# Patient Record
Sex: FEMALE | Race: WHITE | NOT HISPANIC OR LATINO | Employment: UNEMPLOYED | URBAN - METROPOLITAN AREA
[De-identification: names, ages, dates, MRNs, and addresses within clinical notes are randomized per-mention and may not be internally consistent; named-entity substitution may affect disease eponyms.]

---

## 2017-01-02 ENCOUNTER — TELEPHONE (OUTPATIENT)
Dept: OBSTETRICS AND GYNECOLOGY | Facility: CLINIC | Age: 52
End: 2017-01-02

## 2017-01-02 DIAGNOSIS — M06.9 RHEUMATOID ARTHRITIS, INVOLVING UNSPECIFIED SITE, UNSPECIFIED RHEUMATOID FACTOR PRESENCE: ICD-10-CM

## 2017-01-02 DIAGNOSIS — Z11.3 SCREEN FOR STD (SEXUALLY TRANSMITTED DISEASE): Primary | ICD-10-CM

## 2017-01-03 ENCOUNTER — LAB VISIT (OUTPATIENT)
Dept: LAB | Facility: HOSPITAL | Age: 52
End: 2017-01-03
Attending: OBSTETRICS & GYNECOLOGY
Payer: MEDICAID

## 2017-01-03 DIAGNOSIS — Z11.3 SCREEN FOR STD (SEXUALLY TRANSMITTED DISEASE): ICD-10-CM

## 2017-01-03 DIAGNOSIS — M06.9 RHEUMATOID ARTHRITIS, INVOLVING UNSPECIFIED SITE, UNSPECIFIED RHEUMATOID FACTOR PRESENCE: ICD-10-CM

## 2017-01-03 LAB — RHEUMATOID FACT SERPL-ACNC: <10 IU/ML

## 2017-01-03 PROCEDURE — 80074 ACUTE HEPATITIS PANEL: CPT

## 2017-01-03 PROCEDURE — 36415 COLL VENOUS BLD VENIPUNCTURE: CPT | Mod: PO

## 2017-01-03 PROCEDURE — 86431 RHEUMATOID FACTOR QUANT: CPT

## 2017-01-04 LAB
HAV IGM SERPL QL IA: NEGATIVE
HBV CORE IGM SERPL QL IA: NEGATIVE
HBV SURFACE AG SERPL QL IA: NEGATIVE
HCV AB SERPL QL IA: NEGATIVE

## 2017-01-05 ENCOUNTER — TELEPHONE (OUTPATIENT)
Dept: OBSTETRICS AND GYNECOLOGY | Facility: CLINIC | Age: 52
End: 2017-01-05

## 2017-01-05 NOTE — TELEPHONE ENCOUNTER
Her hepatitis panel was negatvie, but her chol was 250! Needs to diet/excercis and rpt in 2-3 months, call us when ready. If stays high will need meds with her primary. thanks

## 2017-01-06 NOTE — TELEPHONE ENCOUNTER
Informed patient of results. Patient voiced understanding. Notified patient to call office if there were any further questions.   Patient inquired about what precautionary methods her and her partner can do to prevent transmission. Patient given precautions and education and to follow up with hepatologist. Verbalized understanding.

## 2017-01-17 ENCOUNTER — TELEPHONE (OUTPATIENT)
Dept: SURGERY | Facility: CLINIC | Age: 52
End: 2017-01-17

## 2017-01-17 NOTE — TELEPHONE ENCOUNTER
----- Message from Temo Lyons sent at 1/17/2017 10:44 AM CST -----  Patient states that she needs to speak with nurse in ref to scheduling an appt for tumor in neck;pt states that she has an appt at 1, if called after, leave message with her mom Amber//please call back at [phone] or [phone]//thank you

## 2017-01-27 ENCOUNTER — TELEPHONE (OUTPATIENT)
Dept: SURGERY | Facility: CLINIC | Age: 52
End: 2017-01-27

## 2017-01-27 NOTE — TELEPHONE ENCOUNTER
----- Message from Rafael Randolph sent at 1/27/2017 11:22 AM CST -----  Contact: Amber Weiss- Mother  Dena,    Caller wants to speak with you to set up appt with her daughter. Caller said she faxed records over. Please call regarding this at [phone] or [phone]

## 2017-01-27 NOTE — TELEPHONE ENCOUNTER
Spoke with pt mother, I faxed over the release to [facility] but I have not received the records yet, I will call back once I have received the records

## 2017-01-30 ENCOUNTER — TELEPHONE (OUTPATIENT)
Dept: SURGERY | Facility: CLINIC | Age: 52
End: 2017-01-30

## 2017-01-30 NOTE — TELEPHONE ENCOUNTER
RECEIVED SOME RECORDS FROM [facility] , ALTHOUGH THEY ARE NOT THE RECORDS WE NEED, I WILL SCHEDULE AN APPOINTMENT ANYWAYS

## 2017-02-07 ENCOUNTER — TELEPHONE (OUTPATIENT)
Dept: OBSTETRICS AND GYNECOLOGY | Facility: CLINIC | Age: 52
End: 2017-02-07

## 2017-02-07 NOTE — TELEPHONE ENCOUNTER
Pt would like to get something for hot flashes. She has medicaid so needs something that will be covered.

## 2017-02-07 NOTE — TELEPHONE ENCOUNTER
----- Message from Karin Blanchard sent at 2/7/2017  3:19 PM CST -----  Contact: self/[phone]  Patient would like a prescription for something for hot flashes because she is sweating a lot at night and she would like this to be a medication that is covered by medicaid.  Please advise

## 2017-02-08 ENCOUNTER — LAB VISIT (OUTPATIENT)
Dept: LAB | Facility: HOSPITAL | Age: 52
End: 2017-02-08
Attending: INTERNAL MEDICINE
Payer: MEDICAID

## 2017-02-08 DIAGNOSIS — R19.09 ABDOMINAL OR PELVIC SWELLING, MASS, OR LUMP, OTHER SPECIFIED SITE: ICD-10-CM

## 2017-02-08 DIAGNOSIS — N63.0 BREAST LUMP: Primary | ICD-10-CM

## 2017-02-08 DIAGNOSIS — R11.2 NAUSEA WITH VOMITING: ICD-10-CM

## 2017-02-08 DIAGNOSIS — R93.5 NONSPECIFIC (ABNORMAL) FINDINGS ON RADIOLOGICAL AND OTHER EXAMINATION OF ABDOMINAL AREA, INCLUDING RETROPERITONEUM: Primary | ICD-10-CM

## 2017-02-08 LAB
25(OH)D3+25(OH)D2 SERPL-MCNC: 20 NG/ML
ALBUMIN SERPL BCP-MCNC: 4.5 G/DL
ALP SERPL-CCNC: 80 U/L
ALT SERPL W/O P-5'-P-CCNC: 23 U/L
AMYLASE SERPL-CCNC: 49 U/L
ANION GAP SERPL CALC-SCNC: 9 MMOL/L
AST SERPL-CCNC: 23 U/L
BASOPHILS # BLD AUTO: 0.05 K/UL
BASOPHILS NFR BLD: 0.6 %
BILIRUB SERPL-MCNC: 0.2 MG/DL
BUN SERPL-MCNC: 17 MG/DL
CALCIUM SERPL-MCNC: 9.7 MG/DL
CALCIUM SERPL-MCNC: 9.7 MG/DL
CHLORIDE SERPL-SCNC: 98 MMOL/L
CO2 SERPL-SCNC: 30 MMOL/L
CREAT SERPL-MCNC: 0.8 MG/DL
CRP SERPL-MCNC: 2.6 MG/L
DIFFERENTIAL METHOD: ABNORMAL
EOSINOPHIL # BLD AUTO: 0.2 K/UL
EOSINOPHIL NFR BLD: 2.8 %
ERYTHROCYTE [DISTWIDTH] IN BLOOD BY AUTOMATED COUNT: 11.7 %
EST. GFR  (AFRICAN AMERICAN): >60 ML/MIN/1.73 M^2
EST. GFR  (NON AFRICAN AMERICAN): >60 ML/MIN/1.73 M^2
GLUCOSE SERPL-MCNC: 97 MG/DL
HCT VFR BLD AUTO: 40.4 %
HGB BLD-MCNC: 13.6 G/DL
LIPASE SERPL-CCNC: 19 U/L
LYMPHOCYTES # BLD AUTO: 2.1 K/UL
LYMPHOCYTES NFR BLD: 25.4 %
MCH RBC QN AUTO: 32.5 PG
MCHC RBC AUTO-ENTMCNC: 33.7 %
MCV RBC AUTO: 96 FL
MONOCYTES # BLD AUTO: 0.7 K/UL
MONOCYTES NFR BLD: 8 %
NEUTROPHILS # BLD AUTO: 5.1 K/UL
NEUTROPHILS NFR BLD: 63 %
PLATELET # BLD AUTO: 276 K/UL
PMV BLD AUTO: 9.5 FL
POTASSIUM SERPL-SCNC: 4.3 MMOL/L
PROT SERPL-MCNC: 7.8 G/DL
RBC # BLD AUTO: 4.19 M/UL
SODIUM SERPL-SCNC: 137 MMOL/L
VIT B12 SERPL-MCNC: 814 PG/ML
WBC # BLD AUTO: 8.1 K/UL

## 2017-02-08 PROCEDURE — 80053 COMPREHEN METABOLIC PANEL: CPT

## 2017-02-08 PROCEDURE — 36415 COLL VENOUS BLD VENIPUNCTURE: CPT | Mod: PO

## 2017-02-08 PROCEDURE — 86651 ENCEPHALITIS CALIFORN ANTBDY: CPT

## 2017-02-08 PROCEDURE — 83690 ASSAY OF LIPASE: CPT

## 2017-02-08 PROCEDURE — 82150 ASSAY OF AMYLASE: CPT

## 2017-02-08 PROCEDURE — 82607 VITAMIN B-12: CPT

## 2017-02-08 PROCEDURE — 85025 COMPLETE CBC W/AUTO DIFF WBC: CPT

## 2017-02-08 PROCEDURE — 82306 VITAMIN D 25 HYDROXY: CPT

## 2017-02-08 PROCEDURE — 86140 C-REACTIVE PROTEIN: CPT

## 2017-02-08 RX ORDER — ESTRADIOL AND NORETHINDRONE ACETATE .5; .1 MG/1; MG/1
1 TABLET ORAL DAILY
Qty: 30 TABLET | Refills: 2 | Status: SHIPPED | OUTPATIENT
Start: 2017-02-08 | End: 2018-02-08

## 2017-02-08 NOTE — TELEPHONE ENCOUNTER
Pt notified about medication and verbalized understanding. Also, she went to her GI doctor and while she was there she c/o lump in the breast and he ordered a mammo. She just wanted to make you aware so you can look at it when it comes back.

## 2017-02-08 NOTE — TELEPHONE ENCOUNTER
Left message on self di voicemail that medication has been sent in and if insurance doesn't cover it she can call us.

## 2017-02-08 NOTE — TELEPHONE ENCOUNTER
----- Message from Phuong Cohen sent at 2/8/2017  7:57 AM CST -----  Patient no. [phone]   Patient returned your call.   Please call before 10:00 or after 1:00.

## 2017-02-09 ENCOUNTER — OFFICE VISIT (OUTPATIENT)
Dept: SURGERY | Facility: CLINIC | Age: 52
End: 2017-02-09
Payer: MEDICAID

## 2017-02-09 ENCOUNTER — TELEPHONE (OUTPATIENT)
Dept: SURGERY | Facility: CLINIC | Age: 52
End: 2017-02-09

## 2017-02-09 VITALS
WEIGHT: 136 LBS | HEIGHT: 63 IN | TEMPERATURE: 98 F | HEART RATE: 66 BPM | DIASTOLIC BLOOD PRESSURE: 70 MMHG | SYSTOLIC BLOOD PRESSURE: 114 MMHG | BODY MASS INDEX: 24.1 KG/M2

## 2017-02-09 DIAGNOSIS — Z85.850 HISTORY OF THYROID CANCER: Primary | ICD-10-CM

## 2017-02-09 PROCEDURE — 99213 OFFICE O/P EST LOW 20 MIN: CPT | Mod: PBBFAC | Performed by: SURGERY

## 2017-02-09 PROCEDURE — 99204 OFFICE O/P NEW MOD 45 MIN: CPT | Mod: S$PBB,,, | Performed by: SURGERY

## 2017-02-09 PROCEDURE — 99999 PR PBB SHADOW E&M-EST. PATIENT-LVL III: CPT | Mod: PBBFAC,,, | Performed by: SURGERY

## 2017-02-09 RX ORDER — AMLODIPINE BESYLATE 10 MG/1
10 TABLET ORAL EVERY MORNING
Refills: 0 | COMMUNITY
Start: 2017-01-23

## 2017-02-09 RX ORDER — ESTRADIOL 0.5 MG/1
0.5 TABLET ORAL DAILY
Qty: 30 TABLET | Refills: 1 | Status: SHIPPED | OUTPATIENT
Start: 2017-02-09 | End: 2017-04-06

## 2017-02-09 RX ORDER — CITALOPRAM 20 MG/1
20 TABLET, FILM COATED ORAL DAILY
Refills: 0 | COMMUNITY
Start: 2017-01-23

## 2017-02-09 RX ORDER — CITALOPRAM 40 MG/1
40 TABLET, FILM COATED ORAL EVERY MORNING
Refills: 1 | COMMUNITY
Start: 2017-02-02

## 2017-02-09 RX ORDER — MEDROXYPROGESTERONE ACETATE 5 MG/1
5 TABLET ORAL DAILY
Qty: 30 TABLET | Refills: 1 | Status: SHIPPED | OUTPATIENT
Start: 2017-02-09 | End: 2017-04-06 | Stop reason: SDUPTHER

## 2017-02-09 RX ORDER — TRAZODONE HYDROCHLORIDE 50 MG/1
50 TABLET ORAL NIGHTLY
Refills: 0 | COMMUNITY
Start: 2017-02-02

## 2017-02-09 RX ORDER — BUPRENORPHINE HYDROCHLORIDE, NALOXONE HYDROCHLORIDE 8; 2 MG/1; MG/1
FILM, SOLUBLE BUCCAL; SUBLINGUAL
Refills: 0 | COMMUNITY
Start: 2017-02-02

## 2017-02-09 RX ORDER — TRIAMTERENE AND HYDROCHLOROTHIAZIDE 37.5; 25 MG/1; MG/1
1 CAPSULE ORAL EVERY MORNING
Refills: 0 | COMMUNITY
Start: 2017-01-23

## 2017-02-09 RX ORDER — METHOCARBAMOL 750 MG/1
750 TABLET, FILM COATED ORAL 3 TIMES DAILY PRN
Refills: 0 | COMMUNITY
Start: 2017-01-23

## 2017-02-09 NOTE — TELEPHONE ENCOUNTER
----- Message from Phuong Cohen sent at 2/9/2017  7:57 AM CST -----  Phuogn with [facility] called.  Ext. [phone]   Patient has a lump.  She needs a diagnostic mammo order.  It needs to be scheduled at [facility] on Friday, 2/10/17 after 1:30.   Please call patient.   No. [phone]

## 2017-02-09 NOTE — TELEPHONE ENCOUNTER
Ok for estradiol 0.5  Provera 5mg, one each daily, give 30 with one refil, want her to call us to update in amonth,thanks

## 2017-02-09 NOTE — PROGRESS NOTES
History & Physical  Surgery      SUBJECTIVE:     Chief Complaint/Reason for Admission: neck mass    History of Present Illness: Daly Booker is a 52 y.o. female with h/o Lord's esophagus and papillary thyroid CA s/p left hemithyroidectomy at [facility] in 2009.  She also underwent radioactive iodine ablation with 5 days of inpatient therapy.  Patient had been undergoing annual US surveillance of her right thyroid postoperatively but was dropped by Medicaid for two years and has not had an ultrasound since 2014.  US in 2014 showed surgically absent left thyroid lobe, normal size of R thyroid lobe with 3 thyroid nodules all less than 6mm in size, stable US since 2009.  Pt states that she recently felt a small lump to the left lateral aspect of her neck incision and was concerned that it was an enlarged lymph node.      Current Outpatient Prescriptions on File Prior to Visit   Medication Sig    alprazolam (XANAX) 0.5 MG tablet Take 1 tablet (0.5 mg total) by mouth 3 (three) times daily as needed for Insomnia or Anxiety.    estradiol (ESTRACE) 0.5 MG tablet Take 1 tablet (0.5 mg total) by mouth once daily.    estradiol-norethindrone acet 0.5-0.1 mg per tablet Take 1 tablet by mouth once daily.    estradiol-norethindrone acet 0.5-0.1 mg per tablet Take 1 tablet by mouth once daily.    medroxyPROGESTERone (PROVERA) 5 MG tablet Take 1 tablet (5 mg total) by mouth once daily.    triamterene-hydrochlorothiazide 37.5-25 mg (MAXZIDE-25) 37.5-25 mg per tablet TAKE 1 TABLET BY MOUTH ONCE DAILY    misoprostol (CYTOTEC) 200 MCG Tab Take 1 tablet (200 mcg total) by mouth 2 (two) times daily.    paroxetine (PAXIL) 20 MG tablet Take 1 tablet (20 mg total) by mouth every morning.    ramipril (ALTACE) 5 MG capsule Take 1 capsule (5 mg total) by mouth once daily.    [DISCONTINUED] medroxyPROGESTERone (PROVERA) 5 MG tablet Take 1 tablet (5 mg total) by mouth once daily.     No current facility-administered medications on file  prior to visit.        Review of patient's allergies indicates:   Allergen Reactions    Iodine containing multivitamin Hives     Reaction to IV contrast dye        Past Medical History   Diagnosis Date    Cancer      Thyroid    Hypertension     Lupus     Thyromegaly      Past Surgical History   Procedure Laterality Date    Thyroidectomy       section      Tonsillectomy      Appendectomy      Breast inplant       bt    Arm surgery   2002     Family History   Problem Relation Age of Onset    Hypertension Father     Heart disease Father     Cancer Father      prostate    Cancer Maternal Aunt      lung    Cancer Maternal Uncle      lung    Cancer Paternal Aunt      pancreas    Cancer Maternal Grandmother      skin    Cancer Maternal Grandfather      prostate     Social History   Substance Use Topics    Smoking status: Former Smoker     Types: Cigarettes     Quit date: 8/10/2006    Smokeless tobacco: Never Used    Alcohol use Yes      Comment: occasional        Review of Systems   Constitutional: Negative for chills, fatigue and fever.   HENT: Negative for congestion and rhinorrhea.    Eyes: Negative for visual disturbance.   Respiratory: Negative for cough and shortness of breath.    Cardiovascular: Negative for chest pain and leg swelling.   Gastrointestinal: Negative for abdominal pain, constipation, diarrhea, nausea and vomiting.   Endocrine: Negative for polyuria.   Genitourinary: Negative for dysuria.   Musculoskeletal: Positive for arthralgias and joint swelling (L knee swelling after fall). Negative for myalgias.   Skin: Negative for wound.   Neurological: Negative for dizziness, light-headedness and headaches.   Psychiatric/Behavioral: Negative for agitation.     OBJECTIVE:     Vital Signs (Most Recent)  Temp: 98 °F (36.7 °C) (17 1443)  Pulse: 66 (17 1443)  BP: 114/70 (17 1443)    Physical Exam   Constitutional: She is oriented to person, place, and time. She  appears well-developed and well-nourished. No distress.   HENT:   Head: Normocephalic and atraumatic.   Well healed transverse cervical incision.  No palpable thyroid nodules or lymphadenopathy.   Eyes: Conjunctivae and EOM are normal. Pupils are equal, round, and reactive to light.   Neck: Normal range of motion. Neck supple. No tracheal deviation present.   Cardiovascular: Normal rate, regular rhythm and normal heart sounds.    Pulmonary/Chest: Effort normal and breath sounds normal.   Abdominal: Soft. Bowel sounds are normal.   Musculoskeletal: Normal range of motion. She exhibits no edema.   Neurological: She is alert and oriented to person, place, and time.   Skin: Skin is warm and dry.   Psychiatric: She has a normal mood and affect.       Diagnostic Results:  Previous thyroid ultrasounds reviewed, most recent from 2014.  Pt s/p L hemithyroidectomy.  3 stable right thyroid nodules all less than 6mm.  No evidence of enlarged lymph nodes    ASSESSMENT/PLAN:     A/P:  53 yo F s/p left hemithyroidectomy for papillary thyroid CA in 2009.  Currently no palpable thyroid nodules or lymphadenopathy    Schedule thyroid US and continue annual surveillance  Referral to Endocrinology    I have personally taken the history and examined this patient and agree with the resident's note as stated above.  Hx as above  Will schedule the patient for a surveillance neck/thyroid US and refer to Endocrinology for continued surveillance.  F/U with me prn at this point unless thyroid US reveals nodules or LNs that meets criteria for FNA.

## 2017-02-09 NOTE — TELEPHONE ENCOUNTER
Pt mammo has been ordered. She will call robe to schedule. Pt insurance is not covering medication. She has medicaid so they may only cover the estradiol and progesterone

## 2017-02-09 NOTE — TELEPHONE ENCOUNTER
----- Message from Marsha Sprague sent at 2/9/2017  8:43 AM CST -----  Regarding: pt needs diagnostic mammo  Pt was scheduled as a screening mammogram. Was unable to go to [facility] today. Please order a diagnostic mammogram and schedule at the [facility] breast center. Pt has class until 1pm, states she can go tomorrow. Her phone is [phone] or [phone] home. Thank you , Marsha  ([facility])

## 2017-02-09 NOTE — Clinical Note
Needs thyroid US of neck bilaterally and referral to Endocrinology for surveillance of pt with x of thyroid CA

## 2017-02-10 ENCOUNTER — HOSPITAL ENCOUNTER (OUTPATIENT)
Dept: RADIOLOGY | Facility: HOSPITAL | Age: 52
Discharge: HOME | End: 2017-02-10
Attending: OBSTETRICS & GYNECOLOGY
Payer: MEDICAID

## 2017-02-10 DIAGNOSIS — N63.0 BREAST LUMP: ICD-10-CM

## 2017-02-10 LAB
EEEV IGG SER QL IF: NORMAL
EEEV IGM SER QL: NORMAL
LACV IGG SER QL IF: NORMAL
LACV IGM SER QL: NORMAL
SLEV IGG TITR SER IF: NORMAL {TITER}
SLEV IGM SER QL: NORMAL
WEEV IGG TITR SER IF: NORMAL {TITER}
WEEV IGM TITR SER IF: NORMAL {TITER}

## 2017-02-10 PROCEDURE — 77062 BREAST TOMOSYNTHESIS BI: CPT | Mod: 26,,, | Performed by: RADIOLOGY

## 2017-02-10 PROCEDURE — 77062 BREAST TOMOSYNTHESIS BI: CPT | Mod: TC

## 2017-02-10 PROCEDURE — 76642 ULTRASOUND BREAST LIMITED: CPT | Mod: 26,LT,, | Performed by: RADIOLOGY

## 2017-02-10 PROCEDURE — 76642 ULTRASOUND BREAST LIMITED: CPT | Mod: TC,LT

## 2017-02-10 PROCEDURE — 77066 DX MAMMO INCL CAD BI: CPT | Mod: 26,,, | Performed by: RADIOLOGY

## 2017-02-13 DIAGNOSIS — E04.2 NONTOXIC MULTINODULAR GOITER: Primary | ICD-10-CM

## 2017-02-16 ENCOUNTER — HOSPITAL ENCOUNTER (OUTPATIENT)
Dept: RADIOLOGY | Facility: HOSPITAL | Age: 52
Discharge: HOME | End: 2017-02-16
Attending: SURGERY
Payer: MEDICAID

## 2017-02-16 DIAGNOSIS — E04.2 NONTOXIC MULTINODULAR GOITER: ICD-10-CM

## 2017-02-16 PROCEDURE — 76536 US EXAM OF HEAD AND NECK: CPT | Mod: TC

## 2017-02-16 PROCEDURE — 76536 US EXAM OF HEAD AND NECK: CPT | Mod: 26,,, | Performed by: RADIOLOGY

## 2017-02-17 ENCOUNTER — TELEPHONE (OUTPATIENT)
Dept: SURGERY | Facility: CLINIC | Age: 52
End: 2017-02-17

## 2017-02-17 NOTE — TELEPHONE ENCOUNTER
----- Message from Rafael Randolph sent at 2/17/2017 11:05 AM CST -----  Pt said she had an ultrasound done yesterday and she was suppose to hear back from Dr. Dover with results. Please call pt at [phone]

## 2017-02-17 NOTE — TELEPHONE ENCOUNTER
Returned call, no answer left message on pt's VM about US that was normal and not met criteria for FNA biopsy

## 2017-02-20 NOTE — TELEPHONE ENCOUNTER
----- Message from Phuong Cohen sent at 2/20/2017 10:56 AM CST -----  Patient no. [phone]   The hormones are not helping.  She is still sweating.   Please call.

## 2017-02-21 RX ORDER — ESTRADIOL 1 MG/1
1 TABLET ORAL DAILY
Qty: 30 TABLET | Refills: 1 | Status: SHIPPED | OUTPATIENT
Start: 2017-02-21 | End: 2017-04-06 | Stop reason: SDUPTHER

## 2017-02-21 NOTE — TELEPHONE ENCOUNTER
I think she is on estradiol 0.5 and provera 5,  If so, lets up the estradiol to 1mg, and cont the same provera, give 2m onths worth only, want her to call to update us. thanks

## 2017-04-06 ENCOUNTER — TELEPHONE (OUTPATIENT)
Dept: OBSTETRICS AND GYNECOLOGY | Facility: CLINIC | Age: 52
End: 2017-04-06

## 2017-04-06 RX ORDER — ESTRADIOL 1 MG/1
1 TABLET ORAL DAILY
Qty: 30 TABLET | Refills: 5 | Status: SHIPPED | OUTPATIENT
Start: 2017-04-06 | End: 2017-10-24 | Stop reason: SDUPTHER

## 2017-04-06 RX ORDER — MEDROXYPROGESTERONE ACETATE 5 MG/1
5 TABLET ORAL DAILY
Qty: 30 TABLET | Refills: 5 | Status: SHIPPED | OUTPATIENT
Start: 2017-04-06 | End: 2017-10-24 | Stop reason: SDUPTHER

## 2017-04-06 NOTE — TELEPHONE ENCOUNTER
----- Message from Judit Moran sent at 4/6/2017 10:26 AM CDT -----  Contact: Self/[phone]  Patient is returnig your call. Please advise

## 2017-04-06 NOTE — TELEPHONE ENCOUNTER
----- Message from Lara Loaiza sent at 4/6/2017  8:41 AM CDT -----  Contact: [phone]  Patient called in requesting to speak with you. Did not specify why. Please advise.

## 2017-05-04 ENCOUNTER — TELEPHONE (OUTPATIENT)
Dept: OBSTETRICS AND GYNECOLOGY | Facility: CLINIC | Age: 52
End: 2017-05-04

## 2017-05-04 DIAGNOSIS — N92.1 MENOMETRORRHAGIA: Primary | ICD-10-CM

## 2017-05-04 NOTE — TELEPHONE ENCOUNTER
----- Message from Lara Loaiza sent at 5/4/2017 11:17 AM CDT -----  Contact: [phone]/self  Pt called stating she is been bleeding heavenly for the pass three days . Please advise

## 2017-05-04 NOTE — TELEPHONE ENCOUNTER
Stop the hormones for now, allow cycle, can sent cytotec 100 tid #6 for the bleeding now,  Make appt in 1-3 weeks with vag us (last us done 2014)  Want to get us first then discuss, thanks  Order in for us

## 2017-05-04 NOTE — TELEPHONE ENCOUNTER
Pt states that she has been bleeding heavily for the past 3 days. Her last cycle was about 3-4 months ago. She currently takes hormones. This cycle is very heavy and she is having clots and cramping. Pt is considering having a hyst and wants to know what you think.

## 2017-06-20 ENCOUNTER — TELEPHONE (OUTPATIENT)
Dept: OBSTETRICS AND GYNECOLOGY | Facility: CLINIC | Age: 52
End: 2017-06-20

## 2017-06-20 NOTE — TELEPHONE ENCOUNTER
----- Message from Pilar Fairbanks sent at 6/20/2017 10:47 AM CDT -----  Contact: Self/ [phone]  Patient would like to speak with you about some medications. Please advise.

## 2017-06-21 NOTE — TELEPHONE ENCOUNTER
Pt notified that she is not having any more irregular bleeding, she was reminded to come in August for annual. Also, she was told to get the xanax from her PCP. Pt verbalized understanding

## 2017-06-21 NOTE — TELEPHONE ENCOUNTER
See note in may, she was to come back and didt for bleeding, if having any irreg bleeding make appt in 2 weeks with us, if not, then can wait till annual in august,   Needs to get xanax from primary care,

## 2017-09-12 ENCOUNTER — PATIENT MESSAGE (OUTPATIENT)
Dept: ADMINISTRATIVE | Facility: HOSPITAL | Age: 52
End: 2017-09-12

## 2017-10-24 RX ORDER — MEDROXYPROGESTERONE ACETATE 5 MG/1
5 TABLET ORAL DAILY
Qty: 30 TABLET | Refills: 0 | Status: SHIPPED | OUTPATIENT
Start: 2017-10-24 | End: 2017-11-28 | Stop reason: SDUPTHER

## 2017-10-24 RX ORDER — ESTRADIOL 1 MG/1
1 TABLET ORAL DAILY
Qty: 30 TABLET | Refills: 0 | Status: SHIPPED | OUTPATIENT
Start: 2017-10-24 | End: 2017-11-28 | Stop reason: SDUPTHER

## 2017-10-24 NOTE — TELEPHONE ENCOUNTER
----- Message from Pilar Fairbanks sent at 10/24/2017 11:22 AM CDT -----  Contact: Self/ [phone]  Patient would like to speak with you about getting hormone pills while she waits for her appointment. Please advise.

## 2017-11-28 RX ORDER — ESTRADIOL 1 MG/1
1 TABLET ORAL DAILY
Qty: 30 TABLET | Refills: 0 | Status: SHIPPED | OUTPATIENT
Start: 2017-11-28 | End: 2018-11-28

## 2017-11-28 RX ORDER — MEDROXYPROGESTERONE ACETATE 5 MG/1
5 TABLET ORAL DAILY
Qty: 30 TABLET | Refills: 0 | Status: SHIPPED | OUTPATIENT
Start: 2017-11-28 | End: 2018-11-28

## 2017-11-28 NOTE — TELEPHONE ENCOUNTER
----- Message from Raine Johnna sent at 11/28/2017  9:26 AM CST -----  Contact: self, [phone]  Patient requests her hormone medication refill, states she is out and her appt is not until 12/12. Please advise.

## 2018-01-06 ENCOUNTER — DOCUMENTATION ONLY (OUTPATIENT)
Dept: OBSTETRICS AND GYNECOLOGY | Facility: CLINIC | Age: 53
End: 2018-01-06

## 2018-01-06 RX ORDER — MEDROXYPROGESTERONE ACETATE 10 MG/1
TABLET ORAL
Qty: 30 TABLET | Refills: 0 | OUTPATIENT
Start: 2018-01-06

## 2018-01-06 NOTE — PROGRESS NOTES
Phone call 1/6/2018:  Excessive bleeding, on HRT. Will call in PROVERA 10mg, 1 tid.  To ER if worsens or persists  Call office Monday a.m.

## 2018-03-09 ENCOUNTER — LAB VISIT (OUTPATIENT)
Dept: LAB | Facility: HOSPITAL | Age: 53
End: 2018-03-09
Attending: OBSTETRICS & GYNECOLOGY
Payer: MEDICAID

## 2018-03-09 DIAGNOSIS — R53.83 FATIGUE, UNSPECIFIED TYPE: ICD-10-CM

## 2018-03-09 DIAGNOSIS — R53.83 FATIGUE, UNSPECIFIED TYPE: Primary | ICD-10-CM

## 2018-03-09 LAB
ALBUMIN SERPL BCP-MCNC: 4 G/DL
ALP SERPL-CCNC: 103 U/L
ALT SERPL W/O P-5'-P-CCNC: 34 U/L
ANION GAP SERPL CALC-SCNC: 10 MMOL/L
AST SERPL-CCNC: 72 U/L
BASOPHILS # BLD AUTO: 0.05 K/UL
BASOPHILS NFR BLD: 1 %
BILIRUB SERPL-MCNC: 0.4 MG/DL
BUN SERPL-MCNC: 9 MG/DL
CALCIUM SERPL-MCNC: 9.4 MG/DL
CHLORIDE SERPL-SCNC: 100 MMOL/L
CHOLEST SERPL-MCNC: 195 MG/DL
CHOLEST/HDLC SERPL: 2.1 {RATIO}
CO2 SERPL-SCNC: 29 MMOL/L
CREAT SERPL-MCNC: 0.8 MG/DL
DIFFERENTIAL METHOD: ABNORMAL
EOSINOPHIL # BLD AUTO: 0.3 K/UL
EOSINOPHIL NFR BLD: 5.5 %
ERYTHROCYTE [DISTWIDTH] IN BLOOD BY AUTOMATED COUNT: 13.4 %
EST. GFR  (AFRICAN AMERICAN): >60 ML/MIN/1.73 M^2
EST. GFR  (NON AFRICAN AMERICAN): >60 ML/MIN/1.73 M^2
GLUCOSE SERPL-MCNC: 75 MG/DL
HCT VFR BLD AUTO: 39.5 %
HDLC SERPL-MCNC: 91 MG/DL
HDLC SERPL: 46.7 %
HGB BLD-MCNC: 13.2 G/DL
IMM GRANULOCYTES # BLD AUTO: 0.01 K/UL
IMM GRANULOCYTES NFR BLD AUTO: 0.2 %
LDLC SERPL CALC-MCNC: 67 MG/DL
LYMPHOCYTES # BLD AUTO: 2 K/UL
LYMPHOCYTES NFR BLD: 41.5 %
MCH RBC QN AUTO: 31.4 PG
MCHC RBC AUTO-ENTMCNC: 33.4 G/DL
MCV RBC AUTO: 94 FL
MONOCYTES # BLD AUTO: 0.8 K/UL
MONOCYTES NFR BLD: 16.9 %
NEUTROPHILS # BLD AUTO: 1.7 K/UL
NEUTROPHILS NFR BLD: 34.9 %
NONHDLC SERPL-MCNC: 104 MG/DL
NRBC BLD-RTO: 0 /100 WBC
PLATELET # BLD AUTO: 180 K/UL
PMV BLD AUTO: 8.7 FL
POTASSIUM SERPL-SCNC: 4.1 MMOL/L
PROT SERPL-MCNC: 7.7 G/DL
RBC # BLD AUTO: 4.2 M/UL
SODIUM SERPL-SCNC: 139 MMOL/L
TRIGL SERPL-MCNC: 185 MG/DL
TSH SERPL DL<=0.005 MIU/L-ACNC: 2.51 UIU/ML
WBC # BLD AUTO: 4.91 K/UL

## 2018-03-09 PROCEDURE — 80061 LIPID PANEL: CPT

## 2018-03-09 PROCEDURE — 85025 COMPLETE CBC W/AUTO DIFF WBC: CPT

## 2018-03-09 PROCEDURE — 80053 COMPREHEN METABOLIC PANEL: CPT

## 2018-03-09 PROCEDURE — 36415 COLL VENOUS BLD VENIPUNCTURE: CPT | Mod: PO

## 2018-03-09 PROCEDURE — 84443 ASSAY THYROID STIM HORMONE: CPT

## 2018-03-13 ENCOUNTER — TELEPHONE (OUTPATIENT)
Dept: OBSTETRICS AND GYNECOLOGY | Facility: CLINIC | Age: 53
End: 2018-03-13

## 2018-03-14 ENCOUNTER — TELEPHONE (OUTPATIENT)
Dept: OBSTETRICS AND GYNECOLOGY | Facility: CLINIC | Age: 53
End: 2018-03-14

## 2018-03-14 NOTE — TELEPHONE ENCOUNTER
----- Message from Lara Loaiza sent at 3/14/2018 11:30 AM CDT -----  Contact: [phone]  Patient called in returning your call. Please advise

## 2018-03-14 NOTE — TELEPHONE ENCOUNTER
Pt notified Dr. Wiedemann would discuss everything with her at her appt in a week. Pt verbalized understanding

## 2018-04-10 ENCOUNTER — OFFICE VISIT (OUTPATIENT)
Dept: OBSTETRICS AND GYNECOLOGY | Facility: CLINIC | Age: 53
End: 2018-04-10
Payer: MEDICAID

## 2018-04-10 VITALS
WEIGHT: 129.44 LBS | DIASTOLIC BLOOD PRESSURE: 86 MMHG | BODY MASS INDEX: 22.93 KG/M2 | SYSTOLIC BLOOD PRESSURE: 140 MMHG | HEIGHT: 63 IN

## 2018-04-10 DIAGNOSIS — Z12.4 ROUTINE PAPANICOLAOU SMEAR: Primary | ICD-10-CM

## 2018-04-10 PROCEDURE — 99999 PR PBB SHADOW E&M-EST. PATIENT-LVL III: CPT | Mod: PBBFAC,,, | Performed by: OBSTETRICS & GYNECOLOGY

## 2018-04-10 PROCEDURE — 99396 PREV VISIT EST AGE 40-64: CPT | Mod: S$PBB,,, | Performed by: OBSTETRICS & GYNECOLOGY

## 2018-04-10 PROCEDURE — 88175 CYTOPATH C/V AUTO FLUID REDO: CPT

## 2018-04-10 PROCEDURE — 99213 OFFICE O/P EST LOW 20 MIN: CPT | Mod: PBBFAC,PO | Performed by: OBSTETRICS & GYNECOLOGY

## 2018-04-10 NOTE — PROGRESS NOTES
"HPI:   53 y.o.   OB History      Para Term  AB Living    6 1     5 1    SAB TAB Ectopic Multiple Live Births    5       1       Patient's last menstrual period was 2018.    Patient is  here for her annual gynecologic exam.  She has no complaints.     ROS:  GENERAL: No fever, chills, fatigability or weight loss.  SKIN: No rashes, itching or changes in color or texture of skin.  HEAD: No headaches or recent head trauma.  EYES: Visual acuity fine. No photophobia, ocular pain or diplopia.  EARS: Denies ear pain, discharge or vertigo.  NOSE: No loss of smell, no epistaxis or postnasal drip.  MOUTH & THROAT: No hoarseness or change in voice. No excessive gum bleeding.  NODES: Denies swollen glands.  CHEST: Denies MURRAY, cyanosis, wheezing, cough and sputum production.  CARDIOVASCULAR: Denies chest pain, PND, orthopnea or reduced exercise tolerance.  ABDOMEN: Appetite fine. No weight loss. Denies diarrhea, abdominal pain, hematemesis or blood in stool.  URINARY: No flank pain, dysuria or hematuria.  PERIPHERAL VASCULAR: No claudication or cyanosis.  MUSCULOSKELETAL: No joint stiffness or swelling. Denies back pain.  NEUROLOGIC: No history of seizures, paralysis, alteration of gait or coordination.    PE:   BP (!) 140/86   Ht 5' 3" (1.6 m)   Wt 58.7 kg (129 lb 6.6 oz)   LMP 2018   BMI 22.92 kg/m²   APPEARANCE: Well nourished, well developed, in no acute distress.  NECK: Neck symmetric without masses or thyromegaly.  BREASTS: Symmetrical, no skin changes or visible lesions. No palpable masses, nipple discharge or adenopathy bilaterally.  ABDOMEN: Flat. Soft. No tenderness or masses. No hepatosplenomegaly. No hernias. No CVA tenderness.  VULVA: No lesions. Normal female genitalia.  URETHRAL MEATUS: Normal size and location, no lesions, no prolapse.  URETHRA: No masses, tenderness, prolapse or scarring.  VAGINA: Moist and well rugated, no discharge, no significant cystocele or rectocele.  CERVIX: No " lesions and discharge. PAP done.  UTERUS: Normal size, regular shape, mobile, non-tender, bladder base nontender.  ADNEXA: No masses, tenderness or CDS nodularity.  ANUS PERINEUM: Normal.    PROCEDURES:  Pap smear    Assessment:  Normal Gynecologic Exam    Plan:  Mammogram and Colonoscopy if indicated by current recommendations.  Return to clinic in one year or for any problems or complaints.  Off hrt feels miserable  Discussed restarting but lower dose  Provera 2.5, estradiol 0.5 (cut in half)

## 2018-04-23 ENCOUNTER — PATIENT MESSAGE (OUTPATIENT)
Dept: ADMINISTRATIVE | Facility: OTHER | Age: 53
End: 2018-04-23

## 2018-06-22 ENCOUNTER — TELEPHONE (OUTPATIENT)
Dept: FAMILY MEDICINE | Facility: CLINIC | Age: 53
End: 2018-06-22

## 2018-06-22 NOTE — TELEPHONE ENCOUNTER
----- Message from Vicki Roth sent at 6/22/2018 11:52 AM CDT -----  Contact: pt            Name of Who is Calling: ALEJANDRA WILD [[id number]]      What is the request in detail: Staci Vanegasrona is the pt of Dr Mancia.. She spoke with Dr Mancia in regards to accepting her mom as a new pt... Pt has medicaid.. Please advise      Can the clinic reply by [facility]: no      What Number to [facility] Back [facility]: [phone]

## 2018-07-12 ENCOUNTER — TELEPHONE (OUTPATIENT)
Dept: OBSTETRICS AND GYNECOLOGY | Facility: CLINIC | Age: 53
End: 2018-07-12

## 2018-07-12 NOTE — TELEPHONE ENCOUNTER
----- Message from Desire Cardenas sent at 7/12/2018  3:30 PM CDT -----  Contact: [phone]/self  Patient requesting to speak with you regarding having side effects of medication estradiol (ESTRACE) 1 MG tablet. Please advise.

## 2018-07-27 NOTE — TELEPHONE ENCOUNTER
Luz I've been trying to contact this patient.  Patient has been added to the schedule to see Dr. Mancia on 8/13/2018 at 10:40am.  Please contact the patient to inform her of the appt.  If she needs to re-schedule her appt please let me know. thanks

## 2018-07-27 NOTE — TELEPHONE ENCOUNTER
I've called pt several times today no answer left detailed messages. I also tried to reach out to her emergency contact no answer.

## 2018-07-30 NOTE — TELEPHONE ENCOUNTER
I tried to reach pt again this morning no answer I left a message. Appointment reminder has been mailed.

## 2018-07-31 ENCOUNTER — PATIENT OUTREACH (OUTPATIENT)
Dept: ADMINISTRATIVE | Facility: HOSPITAL | Age: 53
End: 2018-07-31

## 2018-07-31 NOTE — PROGRESS NOTES
[facility] is committed to your overall health.  To help you get the most out of each of your visits, we will review your information to make sure you are up to date on all of your recommended tests and/or procedures.       Your PCP   found that you may be due for:       Health Maintenance Due   Topic Date Due    TETANUS VACCINE  01/20/1984    Colonoscopy  01/20/2016             If you have had any of the above done at another facility, please bring the records or information with you so that your record at [facility] will be complete.  If you would like to schedule any of these, please contact me.     If you are currently taking medication, please bring it with you to your appointment for review.     Also, if you have any type of Advanced Directives, please bring them with you to your office visit so we may scan them into your chart.       Thank you for Choosing [facility] for your healthcare needs.        Additional Information  If you have questions, you can email [email or web address] or call [phone]  to talk to our [facility] staff. Remember, [facility] is NOT to be used for urgent needs. For medical emergencies, dial 911.

## 2018-08-13 ENCOUNTER — TELEPHONE (OUTPATIENT)
Dept: FAMILY MEDICINE | Facility: CLINIC | Age: 53
End: 2018-08-13

## 2018-08-13 NOTE — TELEPHONE ENCOUNTER
----- Message from Artur Hameed sent at 8/13/2018  7:51 AM CDT -----  Contact: Daly Baugh            Name of Who is Calling: Daly Baugh      What is the request in detail: Patient called to reschedule her appt. Patient is having car trouble. Unable to reschedule due to Medicaid insurance      Can the clinic reply by [facility]: No      What Number to Call Back [facility] not [facility]: [phone]

## 2018-09-05 RX ORDER — ESTRADIOL 0.5 MG/1
TABLET ORAL
Qty: 30 TABLET | Refills: 0 | Status: SHIPPED | OUTPATIENT
Start: 2018-09-05 | End: 2018-10-02 | Stop reason: SDUPTHER

## 2018-09-05 RX ORDER — MEDROXYPROGESTERONE ACETATE 2.5 MG/1
TABLET ORAL
Qty: 30 TABLET | Refills: 0 | Status: SHIPPED | OUTPATIENT
Start: 2018-09-05 | End: 2018-10-02 | Stop reason: SDUPTHER

## 2018-10-03 RX ORDER — MEDROXYPROGESTERONE ACETATE 2.5 MG/1
TABLET ORAL
Qty: 30 TABLET | Refills: 0 | Status: SHIPPED | OUTPATIENT
Start: 2018-10-03 | End: 2018-11-14 | Stop reason: SDUPTHER

## 2018-10-03 RX ORDER — ESTRADIOL 0.5 MG/1
TABLET ORAL
Qty: 30 TABLET | Refills: 0 | Status: SHIPPED | OUTPATIENT
Start: 2018-10-03 | End: 2018-11-14 | Stop reason: SDUPTHER

## 2018-11-15 RX ORDER — MEDROXYPROGESTERONE ACETATE 2.5 MG/1
TABLET ORAL
Qty: 30 TABLET | Refills: 0 | Status: SHIPPED | OUTPATIENT
Start: 2018-11-15 | End: 2018-12-24 | Stop reason: SDUPTHER

## 2018-11-15 RX ORDER — ESTRADIOL 0.5 MG/1
TABLET ORAL
Qty: 30 TABLET | Refills: 0 | Status: SHIPPED | OUTPATIENT
Start: 2018-11-15 | End: 2018-12-21 | Stop reason: SDUPTHER

## 2018-12-21 NOTE — TELEPHONE ENCOUNTER
----- Message from Terri An sent at 12/21/2018  1:21 PM CST -----  Contact: [phone]/self  Patient requesting a refill for estradiol (ESTRACE) 0.5 MG tablet and medroxyPROGESTERone (PROVERA) 2.5 MG tablet. Grecia Schuster & Katie. Please advise.

## 2018-12-26 RX ORDER — MEDROXYPROGESTERONE ACETATE 2.5 MG/1
2.5 TABLET ORAL DAILY
Qty: 30 TABLET | Refills: 4 | Status: SHIPPED | OUTPATIENT
Start: 2018-12-26 | End: 2019-07-18 | Stop reason: SDUPTHER

## 2018-12-26 RX ORDER — ESTRADIOL 0.5 MG/1
0.5 TABLET ORAL DAILY
Qty: 30 TABLET | Refills: 4 | Status: SHIPPED | OUTPATIENT
Start: 2018-12-26

## 2018-12-26 RX ORDER — MEDROXYPROGESTERONE ACETATE 2.5 MG/1
TABLET ORAL
Qty: 30 TABLET | Refills: 0 | Status: SHIPPED | OUTPATIENT
Start: 2018-12-26

## 2019-01-25 ENCOUNTER — NURSE TRIAGE (OUTPATIENT)
Dept: ADMINISTRATIVE | Facility: CLINIC | Age: 54
End: 2019-01-25

## 2019-01-25 ENCOUNTER — TELEPHONE (OUTPATIENT)
Dept: OBSTETRICS AND GYNECOLOGY | Facility: CLINIC | Age: 54
End: 2019-01-25

## 2019-01-25 NOTE — TELEPHONE ENCOUNTER
----- Message from Digna Tejada sent at 1/25/2019  1:02 PM CST -----  Contact: [phone]/ self   Patient requesting to speak with you regarding retaining a lot of fluid, skin tightness, and swelling. Pt would like to know if medication can be called in. Please advise.     [facility] [address] [facility] [address] [facility] AT [facility]

## 2019-01-26 NOTE — TELEPHONE ENCOUNTER
"  Reason for Disposition   SEVERE leg swelling (e.g., swelling extends above knee, entire leg is swollen, weeping fluid)    Answer Assessment - Initial Assessment Questions  1. ONSET: "When did the swelling start?" (e.g., minutes, hours, days)     BP wnl, god uop   2. LOCATION: "What part of the leg is swollen?"  "Are both legs swollen or just one leg?"     Swelling wed same   3. SEVERITY: "How bad is the swelling?" (e.g., localized; mild, moderate, severe)   - Localized - small area of swelling localized to one leg   - MILD pedal edema - swelling limited to foot and ankle, pitting edema < 1/4 inch (6 mm) deep, rest and elevation eliminate most or all swelling   - MODERATE edema - swelling of lower leg to knee, pitting edema > 1/4 inch (6 mm) deep, rest and elevation only partially reduce swelling   - SEVERE edema - swelling extends above knee, facial or hand swelling present      Pitting mostly the ankles and feet and hands   4. REDNESS: "Does the swelling look red or infected?"    Redness in hnds   5. PAIN: "Is the swelling painful to touch?" If so, ask: "How painful is it?"   (Scale 1-10; mild, moderate or severe)     No   6. FEVER: "Do you have a fever?" If so, ask: "What is it, how was it measured, and when did it start?"      T99.7 this evening , just got over the flu   V/D - bad   7. CAUSE: "What do you think is causing the leg swelling?"     Hx thyroid 2007.  Unsure   8. MEDICAL HISTORY: "Do you have a history of heart failure, kidney disease, liver failure, or cancer?"    No   9. RECURRENT SYMPTOM: "Have you had leg swelling before?" If so, ask: "When was the last time?" "What happened that time?"     No   10. OTHER SYMPTOMS: "Do you have any other symptoms?" (e.g., chest pain, difficulty breathing)     No    Protocols used: ST LEG SWELLING AND EDEMA-A-AH  pt is going through menopause ; pt has fluid coming out of body , pt is experiencing discomfort  pt called re a lot of fluid, on hormones , good uop " started wed. drinking a lot of water, fluid in legs and hands. No SOB. SOB yest. rec ED to evaluate, call back with questions.

## 2019-04-17 ENCOUNTER — TELEPHONE (OUTPATIENT)
Dept: OBSTETRICS AND GYNECOLOGY | Facility: CLINIC | Age: 54
End: 2019-04-17

## 2019-04-17 NOTE — TELEPHONE ENCOUNTER
----- Message from Sandy Pillai sent at 4/17/2019 10:53 AM CDT -----  Contact: Self/ [phone]  Patient called in about her denial on getting her medroxyPROGESTERone (PROVERA) refilled. She stated she hasn't had transportation to come for a office visit. Patient asked if you can refill it one more time.    Please call.

## 2019-04-18 NOTE — TELEPHONE ENCOUNTER
Ok to milagros a few months  Please confrim she is on both estrogen and progesterone (provera) daily??  thanks

## 2019-06-28 NOTE — TELEPHONE ENCOUNTER
----- Message from Brian Ramsay sent at 3/13/2018  1:50 PM CDT -----  Contact: [phone]/self  Pt requesting to speak with you concerning her blood test results.   Please call and advise   
----- Message from Christina Florentino sent at 3/13/2018  9:19 AM CDT -----  Contact: [phone]  Patient would like to get test results. Please call and advise.    
Left message     
Make annual appt next availabel and will discuss, thanks  
Pt notified that her blood looks ok except for high triglycerides. Pt said she is very fatigued and wanted to know what the problem could be. Pt stated she recently got off hormones and has been waking up sweating several times at night. Pt advised she is probably tired from that and maybe being back on hormones would help. Pt would like to have her hormones refilled.  
Pt would like results of blood work done on Friday   
- RLE tender to palpation + "funny feeling in leg"   - 2+ peripheral pulses B/L, warm to palpation,   - vascular consult: recommended abx, vascular no longer following . Started Cefazolin 6/26 for possible cellulitis will continue until 5 total days of treatment      # Black skin lesion  - pt states she was supposed to get it biopsed  - concerning for melenoma  - can be workup up outpatient
- RLE tender to palpation + "funny feeling in leg"   - color of RLE is slightly different. More erythematous, not mottled  - 2+ peripheral pulses B/L, warm to palpation,   - vascular consult, appreciate recs. Started abx for possible cellulitis   - arterial doppler LE b/l    # Black skin lesion  - pt states she was supposed to get it biopsed  - concerning for melenoma  - can be workup up outpatient

## 2019-07-18 RX ORDER — MEDROXYPROGESTERONE ACETATE 2.5 MG/1
2.5 TABLET ORAL DAILY
Qty: 30 TABLET | Refills: 1 | Status: SHIPPED | OUTPATIENT
Start: 2019-07-18

## 2019-07-18 NOTE — TELEPHONE ENCOUNTER
----- Message from Nadine Mason sent at 7/18/2019 11:50 AM CDT -----  Contact: [phone]-self  Pt requesting a callback concerning hormone pills.
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
